# Patient Record
Sex: FEMALE | Race: WHITE | URBAN - METROPOLITAN AREA
[De-identification: names, ages, dates, MRNs, and addresses within clinical notes are randomized per-mention and may not be internally consistent; named-entity substitution may affect disease eponyms.]

---

## 2024-04-05 ENCOUNTER — LAB REQUISITION (OUTPATIENT)
Dept: LAB | Facility: HOSPITAL | Age: 89
End: 2024-04-05

## 2024-04-05 DIAGNOSIS — N39.0 URINARY TRACT INFECTION, SITE NOT SPECIFIED: ICD-10-CM

## 2024-04-05 LAB
APPEARANCE UR: ABNORMAL
BACTERIA #/AREA URNS AUTO: ABNORMAL /HPF
BILIRUB UR STRIP.AUTO-MCNC: NEGATIVE MG/DL
COLOR UR: ABNORMAL
GLUCOSE UR STRIP.AUTO-MCNC: NORMAL MG/DL
KETONES UR STRIP.AUTO-MCNC: NEGATIVE MG/DL
LEUKOCYTE ESTERASE UR QL STRIP.AUTO: ABNORMAL
MUCOUS THREADS #/AREA URNS AUTO: ABNORMAL /LPF
NITRITE UR QL STRIP.AUTO: ABNORMAL
PH UR STRIP.AUTO: 5 [PH]
PROT UR STRIP.AUTO-MCNC: NEGATIVE MG/DL
RBC # UR STRIP.AUTO: NEGATIVE /UL
RBC #/AREA URNS AUTO: ABNORMAL /HPF
SP GR UR STRIP.AUTO: 1.01
SQUAMOUS #/AREA URNS AUTO: ABNORMAL /HPF
UROBILINOGEN UR STRIP.AUTO-MCNC: NORMAL MG/DL
WBC #/AREA URNS AUTO: ABNORMAL /HPF
WBC CLUMPS #/AREA URNS AUTO: ABNORMAL /HPF

## 2024-04-05 PROCEDURE — 81001 URINALYSIS AUTO W/SCOPE: CPT

## 2024-12-30 ENCOUNTER — NURSING HOME VISIT (OUTPATIENT)
Dept: POST ACUTE CARE | Facility: EXTERNAL LOCATION | Age: 89
End: 2024-12-30
Payer: MEDICARE

## 2024-12-30 DIAGNOSIS — I10 HYPERTENSION, UNSPECIFIED TYPE: ICD-10-CM

## 2024-12-30 DIAGNOSIS — E78.5 HYPERLIPIDEMIA, UNSPECIFIED HYPERLIPIDEMIA TYPE: ICD-10-CM

## 2024-12-30 DIAGNOSIS — N18.9 CHRONIC KIDNEY DISEASE, UNSPECIFIED CKD STAGE: ICD-10-CM

## 2024-12-30 DIAGNOSIS — F01.50 VASCULAR DEMENTIA, UNSPECIFIED DEMENTIA SEVERITY, UNSPECIFIED WHETHER BEHAVIORAL, PSYCHOTIC, OR MOOD DISTURBANCE OR ANXIETY (MULTI): ICD-10-CM

## 2024-12-30 DIAGNOSIS — N39.0 URINARY TRACT INFECTION WITHOUT HEMATURIA, SITE UNSPECIFIED: ICD-10-CM

## 2024-12-30 DIAGNOSIS — S42.202D CLOSED FRACTURE OF PROXIMAL END OF LEFT HUMERUS WITH ROUTINE HEALING, UNSPECIFIED FRACTURE MORPHOLOGY, SUBSEQUENT ENCOUNTER: Primary | ICD-10-CM

## 2024-12-30 PROBLEM — R53.1 WEAKNESS: Status: ACTIVE | Noted: 2024-12-30

## 2024-12-30 PROCEDURE — 99305 1ST NF CARE MODERATE MDM 35: CPT | Performed by: INTERNAL MEDICINE

## 2024-12-30 NOTE — PROGRESS NOTES
HISTORY & PHYSICAL    Subjective   Chief complaint: Do Jarvis is a 89 y.o. female who is being seen and evaluated for multiple medical problems.  Patient presents for change in MD service.    HPI:  HPI  Patient is a 89-year-old female presenting for change in MD service.  Patient has been in nursing facility following hospitalization for fall with left proximal humerus fracture.  Patient's fracture treated conservatively.  Patient was discharged to SNF for continued medical management for therapy.  Patient has been working with therapy.  Patient seen and examined at the bedside, appears to be in no acute distress.  Denies chest pain, shortness of breath, nausea vomiting fever chills.  Patient is currently on antibiotic for UTI, tolerating without adverse effects.  Past Medical History:   Diagnosis Date    CKD (chronic kidney disease)     Closed fracture of humerus     Depression     Hyperlipidemia     Hypertension     Urinary tract infection     Vascular dementia        No past surgical history on file.    Family History   Problem Relation Name Age of Onset    No Known Problems Mother      No Known Problems Father         Social History     Socioeconomic History    Marital status: Single       Vital signs:     Objective   Physical Exam  Constitutional:       General: She is not in acute distress.  Eyes:      Extraocular Movements: Extraocular movements intact.   Cardiovascular:      Rate and Rhythm: Normal rate and regular rhythm.   Pulmonary:      Effort: Pulmonary effort is normal.      Breath sounds: Normal breath sounds.   Abdominal:      General: Bowel sounds are normal.      Palpations: Abdomen is soft.   Musculoskeletal:      Cervical back: Neck supple.      Right lower leg: No edema.      Left lower leg: No edema.      Comments: Immobilizer left upper extremity   Neurological:      Mental Status: She is alert.   Psychiatric:         Mood and Affect: Mood normal.         Behavior: Behavior is  cooperative.         Assessment/Plan   Problem List Items Addressed This Visit       Vascular dementia     Assist with ADLs and care.  Monitor for safety         Urinary tract infection     Macrobid 12/31/2024         Hypertension     Monitor blood pressure  Lisinopril  CLARY diet         Closed fracture of humerus - Primary     Left   Conservative management  Therapy  Pain control  F/u outpatient.  Left upper extremity immobilizer         CKD (chronic kidney disease)     Monitor labs  Avoid nephrotoxins         Hyperlipidemia     Monitor labs  Statin          Hospital records reviewed  Medications, treatments, and labs reviewed  Continue medications and treatments as listed in EMR  Discussed with nursing and therapy      Scribe Attestation  I, Jia Vazquez   attest that this documentation has been prepared under the direction and in the presence of Paula Hicks MD    Provider Attestation - Scribe documentation  All medical record entries made by the Scribe were at my direction and personally dictated by me. I have reviewed the chart and agree that the record accurately reflects my personal performance of the history, physical exam, discussion and plan.   Paula Hicks MD

## 2024-12-30 NOTE — ASSESSMENT & PLAN NOTE
Left   Conservative management  Therapy  Pain control  F/u outpatient.  Left upper extremity immobilizer

## 2024-12-30 NOTE — LETTER
Patient: Do Jarvis  : 1933    Encounter Date: 2024    HISTORY & PHYSICAL    Subjective  Chief complaint: Do Jarvis is a 89 y.o. female who is being seen and evaluated for multiple medical problems.  Patient presents for change in MD service.    HPI:  HPI  Patient is a 89-year-old female presenting for change in MD service.  Patient has been in nursing facility following hospitalization for fall with left proximal humerus fracture.  Patient's fracture treated conservatively.  Patient was discharged to SNF for continued medical management for therapy.  Patient has been working with therapy.  Patient seen and examined at the bedside, appears to be in no acute distress.  Denies chest pain, shortness of breath, nausea vomiting fever chills.  Patient is currently on antibiotic for UTI, tolerating without adverse effects.  Past Medical History:   Diagnosis Date   • CKD (chronic kidney disease)    • Closed fracture of humerus    • Depression    • Hyperlipidemia    • Hypertension    • Urinary tract infection    • Vascular dementia        No past surgical history on file.    Family History   Problem Relation Name Age of Onset   • No Known Problems Mother     • No Known Problems Father         Social History     Socioeconomic History   • Marital status: Single       Vital signs:     Objective  Physical Exam  Constitutional:       General: She is not in acute distress.  Eyes:      Extraocular Movements: Extraocular movements intact.   Cardiovascular:      Rate and Rhythm: Normal rate and regular rhythm.   Pulmonary:      Effort: Pulmonary effort is normal.      Breath sounds: Normal breath sounds.   Abdominal:      General: Bowel sounds are normal.      Palpations: Abdomen is soft.   Musculoskeletal:      Cervical back: Neck supple.      Right lower leg: No edema.      Left lower leg: No edema.      Comments: Immobilizer left upper extremity   Neurological:      Mental Status: She is alert.    Psychiatric:         Mood and Affect: Mood normal.         Behavior: Behavior is cooperative.         Assessment/Plan  Problem List Items Addressed This Visit       Vascular dementia     Assist with ADLs and care.  Monitor for safety         Urinary tract infection     Macrobid 12/31/2024         Hypertension     Monitor blood pressure  Lisinopril  CLARY diet         Closed fracture of humerus - Primary     Left   Conservative management  Therapy  Pain control  F/u outpatient.  Left upper extremity immobilizer         CKD (chronic kidney disease)     Monitor labs  Avoid nephrotoxins         Hyperlipidemia     Monitor labs  Statin          Hospital records reviewed  Medications, treatments, and labs reviewed  Continue medications and treatments as listed in EMR  Discussed with nursing and therapy      Scribe Attestation  I, Jennifer Domingo Scribe   attest that this documentation has been prepared under the direction and in the presence of Paula Hicks MD    Provider Attestation - Scribe documentation  All medical record entries made by the Scribe were at my direction and personally dictated by me. I have reviewed the chart and agree that the record accurately reflects my personal performance of the history, physical exam, discussion and plan.   Paula Hicks MD      Electronically Signed By: Paula Hicks MD   12/31/24  8:09 AM